# Patient Record
Sex: MALE | URBAN - METROPOLITAN AREA
[De-identification: names, ages, dates, MRNs, and addresses within clinical notes are randomized per-mention and may not be internally consistent; named-entity substitution may affect disease eponyms.]

---

## 2018-12-14 ENCOUNTER — HOSPITAL ENCOUNTER (OUTPATIENT)
Facility: MEDICAL CENTER | Age: 53
End: 2018-12-27
Attending: INTERNAL MEDICINE | Admitting: INTERNAL MEDICINE

## 2018-12-14 PROCEDURE — 87641 MR-STAPH DNA AMP PROBE: CPT

## 2018-12-15 LAB
MRSA DNA SPEC QL NAA+PROBE: NORMAL
SIGNIFICANT IND 70042: NORMAL
SITE SITE: NORMAL
SOURCE SOURCE: NORMAL

## 2018-12-15 ASSESSMENT — ENCOUNTER SYMPTOMS
MYALGIAS: 0
NAUSEA: 0
CONSTIPATION: 0
SHORTNESS OF BREATH: 0
WHEEZING: 0
ABDOMINAL PAIN: 0
HEARTBURN: 0
DIAPHORESIS: 0
CHILLS: 0
HEADACHES: 0
FEVER: 0

## 2018-12-15 NOTE — [facility]
Hospital Medicine Progress Note, Adult, Complex               Author: Myriam Colmenares Date & Time created: 12/15/2018  9:49 AM     CC: left knee impaired mobility    Interval History:  Patient with left knee surgery 8/1 and impaired mobility worsening after surgery with fibrosis found on imaging  Surgical manipulation was done yesterday with patient having improved mobility today  He is to have daily physical therapy    Review of Systems:  Review of Systems   Constitutional: Negative for chills, diaphoresis and fever.   HENT: Negative for hearing loss.    Respiratory: Negative for shortness of breath and wheezing.    Cardiovascular: Negative for chest pain and leg swelling.   Gastrointestinal: Negative for abdominal pain, constipation, heartburn and nausea.   Genitourinary: Negative for dysuria and frequency.   Musculoskeletal: Negative for myalgias.        Left knee pain and mobility improved after surgical manipulation yesterday   Skin: Negative for itching and rash.   Neurological: Negative for headaches.       Physical Exam:  Physical Exam   Constitutional: He is oriented to person, place, and time. No distress.   HENT:   Mouth/Throat: Oropharynx is clear and moist. No oropharyngeal exudate.   Eyes: Conjunctivae are normal. No scleral icterus.   Neck: Neck supple.   Cardiovascular: Normal rate and regular rhythm.    No murmur heard.  Pulmonary/Chest: Breath sounds normal. No respiratory distress.   Abdominal: Bowel sounds are normal. He exhibits no distension.   Musculoskeletal:   Bruise over lateral left knee with small palpable joint effusion   Neurological: He is alert and oriented to person, place, and time. Coordination normal.   Skin: Skin is warm and dry. He is not diaphoretic.   Psychiatric: His behavior is normal.   Nursing note and vitals reviewed.      Labs:          No results for input(s): SODIUM, POTASSIUM, CHLORIDE, CO2, BUN, CREATININE, MAGNESIUM, PHOSPHORUS, CALCIUM in the last 72 hours.  No  results for input(s): ALTSGPT, ASTSGOT, ALKPHOSPHAT, TBILIRUBIN, DBILIRUBIN, GAMMAGT, AMYLASE, LIPASE, ALB, PREALBUMIN, GLUCOSE in the last 72 hours.  No results for input(s): RBC, HEMOGLOBIN, HEMATOCRIT, PLATELETCT, PROTHROMBTM, APTT, INR, IRON, FERRITIN, TOTIRONBC in the last 72 hours.            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

## 2018-12-16 ASSESSMENT — ENCOUNTER SYMPTOMS
ORTHOPNEA: 0
NAUSEA: 0
CONSTIPATION: 0
DIAPHORESIS: 0
SHORTNESS OF BREATH: 0
WHEEZING: 0
HEARTBURN: 0
FEVER: 0
PALPITATIONS: 0
ABDOMINAL PAIN: 0
MYALGIAS: 0
HEADACHES: 0

## 2018-12-16 NOTE — [facility]
Hospital Medicine Progress Note, Adult, Complex               Author: Myriam Colmenares Date & Time created: 12/16/2018  11:14 AM     CC: left knee impaired mobility    Interval History:  Patient with left knee surgery 8/1 and impaired mobility worsening after surgery with fibrosis found on imaging  Surgical manipulation was done yesterday with patient having improved mobility today  The patient is working well with physical therapy and denies pain    Review of Systems:  Review of Systems   Constitutional: Negative for diaphoresis and fever.   HENT: Negative for hearing loss.    Respiratory: Negative for shortness of breath and wheezing.    Cardiovascular: Negative for chest pain, palpitations and orthopnea.   Gastrointestinal: Negative for abdominal pain, constipation, heartburn and nausea.   Genitourinary: Negative for dysuria and frequency.   Musculoskeletal: Negative for myalgias.        Left knee mobility much improved   Skin: Negative for rash.   Neurological: Negative for headaches.       Physical Exam:  Physical Exam   Constitutional: He is oriented to person, place, and time. No distress.   HENT:   Mouth/Throat: No oropharyngeal exudate.   Eyes: Conjunctivae are normal. No scleral icterus.   Neck: Neck supple.   Cardiovascular: Normal rate and regular rhythm.    No murmur heard.  Pulmonary/Chest: Breath sounds normal. No respiratory distress. He has no wheezes.   Abdominal: He exhibits no distension.   Musculoskeletal:   Left knee small palpable joint effusion   Neurological: He is alert and oriented to person, place, and time. Coordination normal.   Skin: Skin is warm and dry. He is not diaphoretic.   Psychiatric: His behavior is normal.   Nursing note and vitals reviewed.      Labs:          No results for input(s): SODIUM, POTASSIUM, CHLORIDE, CO2, BUN, CREATININE, MAGNESIUM, PHOSPHORUS, CALCIUM in the last 72 hours.  No results for input(s): ALTSGPT, ASTSGOT, ALKPHOSPHAT, TBILIRUBIN, DBILIRUBIN, GAMMAGT,  AMYLASE, LIPASE, ALB, PREALBUMIN, GLUCOSE in the last 72 hours.  No results for input(s): RBC, HEMOGLOBIN, HEMATOCRIT, PLATELETCT, PROTHROMBTM, APTT, INR, IRON, FERRITIN, TOTIRONBC in the last 72 hours.            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

## 2018-12-17 PROCEDURE — 302244 HCHG LTACH STAT

## 2018-12-17 ASSESSMENT — ENCOUNTER SYMPTOMS
SINUS PAIN: 0
SHORTNESS OF BREATH: 0
FEVER: 0
WHEEZING: 0
COUGH: 0
ABDOMINAL PAIN: 0
HEADACHES: 0
NAUSEA: 0
WEAKNESS: 0
HEARTBURN: 0
CONSTIPATION: 0
MYALGIAS: 0

## 2018-12-17 NOTE — [facility]
Hospital Medicine Progress Note, Adult, Complex               Author: Myriam Colmenares Date & Time created: 12/17/2018  9:04 AM     CC: left knee impaired mobility    Interval History:  Patient with left knee surgery 8/1 and impaired mobility worsening after surgery with fibrosis found on imaging  Surgical manipulation was done in the operating room  Daily physical therapy continues with the patient having better range of motion    Review of Systems:  Review of Systems   Constitutional: Negative for fever and malaise/fatigue.   HENT: Negative for congestion and sinus pain.    Respiratory: Negative for cough, shortness of breath and wheezing.    Cardiovascular: Negative for chest pain and leg swelling.   Gastrointestinal: Negative for abdominal pain, constipation, heartburn and nausea.   Genitourinary: Negative for frequency and urgency.   Musculoskeletal: Negative for myalgias.        Left knee mobility much improved   Skin: Negative for itching.   Neurological: Negative for weakness and headaches.       Physical Exam:  Physical Exam   Constitutional: He is oriented to person, place, and time. No distress.   HENT:   Mouth/Throat: Oropharynx is clear and moist. No oropharyngeal exudate.   Eyes: Pupils are equal, round, and reactive to light. No scleral icterus.   Neck: No tracheal deviation present.   Cardiovascular: Normal rate and regular rhythm.    No murmur heard.  Pulmonary/Chest: Breath sounds normal. No stridor. No respiratory distress. He has no wheezes.   Abdominal: Soft. He exhibits no distension.   Musculoskeletal:   Left knee small palpable joint effusion   Neurological: He is alert and oriented to person, place, and time. Coordination normal.   Skin: Skin is dry. No rash noted. He is not diaphoretic. No erythema.   Psychiatric: His behavior is normal.   Nursing note and vitals reviewed.      Labs:          No results for input(s): SODIUM, POTASSIUM, CHLORIDE, CO2, BUN, CREATININE, MAGNESIUM, PHOSPHORUS,  CALCIUM in the last 72 hours.  No results for input(s): ALTSGPT, ASTSGOT, ALKPHOSPHAT, TBILIRUBIN, DBILIRUBIN, GAMMAGT, AMYLASE, LIPASE, ALB, PREALBUMIN, GLUCOSE in the last 72 hours.  No results for input(s): RBC, HEMOGLOBIN, HEMATOCRIT, PLATELETCT, PROTHROMBTM, APTT, INR, IRON, FERRITIN, TOTIRONBC in the last 72 hours.            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

## 2018-12-18 ASSESSMENT — ENCOUNTER SYMPTOMS
SHORTNESS OF BREATH: 0
SORE THROAT: 0
NAUSEA: 0
CONSTIPATION: 0
DIARRHEA: 0
COUGH: 0
FEVER: 0
HEADACHES: 0
ABDOMINAL PAIN: 0
VOMITING: 0
PALPITATIONS: 0

## 2018-12-18 NOTE — [facility]
Hospital Medicine Progress Note, Adult, Complex               Author: Kimberly LINDO Gabriella Date & Time created: 12/18/2018  5:58 AM     CC: need for PT    Interval History:  No events reported  Ambulated 200 feet with therapy yesterday    Review of Systems:  Review of Systems   Constitutional: Negative for fever.   HENT: Negative for sore throat.    Respiratory: Negative for cough and shortness of breath.    Cardiovascular: Negative for chest pain and palpitations.   Gastrointestinal: Negative for abdominal pain, constipation, diarrhea, nausea and vomiting.   Genitourinary: Negative for dysuria.   Musculoskeletal: Positive for joint pain (L knee).   Neurological: Negative for headaches.       T 97.9 P 65 /84 RR 18 SaO2 96% I/O 2.5/brp  Physical Exam   Constitutional: He is oriented to person, place, and time. He appears well-developed and well-nourished. No distress.   HENT:   Head: Normocephalic and atraumatic.   Mouth/Throat: No oropharyngeal exudate.   Eyes: Conjunctivae are normal. Right eye exhibits no discharge. Left eye exhibits no discharge. No scleral icterus.   Neck: No tracheal deviation present.   Cardiovascular: Normal rate, regular rhythm and intact distal pulses.    Pulmonary/Chest: Effort normal. No respiratory distress. He has no wheezes.   Abdominal: Soft. Bowel sounds are normal. He exhibits no distension. There is no tenderness.   Musculoskeletal: He exhibits no edema.   Neurological: He is alert and oriented to person, place, and time.   Skin: Skin is warm.   Vitals reviewed.      Labs:          No results for input(s): SODIUM, POTASSIUM, CHLORIDE, CO2, BUN, CREATININE, MAGNESIUM, PHOSPHORUS, CALCIUM in the last 72 hours.  No results for input(s): ALTSGPT, ASTSGOT, ALKPHOSPHAT, TBILIRUBIN, DBILIRUBIN, GAMMAGT, AMYLASE, LIPASE, ALB, PREALBUMIN, GLUCOSE in the last 72 hours.  No results for input(s): RBC, HEMOGLOBIN, HEMATOCRIT, PLATELETCT, PROTHROMBTM, APTT, INR, IRON, FERRITIN, TOTIRONBC in  the last 72 hours.           GI/Nutrition:  Orders Placed This Encounter   Procedures   • Diet Order Regular     Standing Status:   Standing     Number of Occurrences:   1     Order Specific Question:   Diet:     Answer:   Regular [1]     Medical Decision Making, by Problem:  H/o L knee partial replacement with scar tissue and decreased mobility s/p scar tissue release under anesthesia prior to admit   -PT  H/o HCV s/p treatment  Prior substance abuse (meth, mj, Etoh)  Nutrition   -excellent po intake    Quality-Core Measures   Reviewed items::  Medications reviewed  Herman catheter::  No Herman  DVT prophylaxis pharmacological::  Enoxaparin (Lovenox)

## 2018-12-19 ASSESSMENT — ENCOUNTER SYMPTOMS
NAUSEA: 0
SORE THROAT: 0
SHORTNESS OF BREATH: 0
VOMITING: 0
FEVER: 0
CONSTIPATION: 0
HEADACHES: 0
DIARRHEA: 0
ABDOMINAL PAIN: 0
COUGH: 0

## 2018-12-19 NOTE — [facility]
Hospital Medicine Progress Note, Adult, Complex               Author: Kimberly LINDO Rulo Date & Time created: 12/19/2018  6:20 AM     CC: need for PT    Interval History:  Improved ROM per report  Pain OK    Review of Systems:  Review of Systems   Constitutional: Negative for fever.   HENT: Negative for sore throat.    Respiratory: Negative for cough and shortness of breath.    Cardiovascular: Negative for chest pain.   Gastrointestinal: Negative for abdominal pain, constipation, diarrhea, nausea and vomiting.   Genitourinary: Negative for dysuria.   Musculoskeletal: Positive for joint pain (L knee).   Skin: Negative for rash.   Neurological: Negative for headaches.       T 98.8 P88BP 133/88 RR 18 SaO2 97% I/O 2.7/brp  Physical Exam   Constitutional: He is oriented to person, place, and time. He appears well-developed and well-nourished. No distress.   HENT:   Head: Normocephalic and atraumatic.   Mouth/Throat: No oropharyngeal exudate.   Eyes: Conjunctivae are normal. Right eye exhibits no discharge. Left eye exhibits no discharge. No scleral icterus.   Neck: No tracheal deviation present.   Cardiovascular: Normal rate, regular rhythm and intact distal pulses.    Pulmonary/Chest: Effort normal. No respiratory distress. He has no wheezes.   Abdominal: Soft. Bowel sounds are normal. He exhibits no distension. There is no tenderness.   Musculoskeletal: He exhibits no edema.   L knee incision healed  No warmth/erythema   Neurological: He is alert and oriented to person, place, and time.   Skin: Skin is warm.   Vitals reviewed.      Labs:          No results for input(s): SODIUM, POTASSIUM, CHLORIDE, CO2, BUN, CREATININE, MAGNESIUM, PHOSPHORUS, CALCIUM in the last 72 hours.  No results for input(s): ALTSGPT, ASTSGOT, ALKPHOSPHAT, TBILIRUBIN, DBILIRUBIN, GAMMAGT, AMYLASE, LIPASE, ALB, PREALBUMIN, GLUCOSE in the last 72 hours.  No results for input(s): RBC, HEMOGLOBIN, HEMATOCRIT, PLATELETCT, PROTHROMBTM, APTT, INR, IRON,  FERRITIN, TOTIRONBC in the last 72 hours.           GI/Nutrition:  Orders Placed This Encounter   Procedures   • Diet Order Regular     Standing Status:   Standing     Number of Occurrences:   1     Order Specific Question:   Diet:     Answer:   Regular [1]     Medical Decision Making, by Problem:  H/o L knee partial replacement with scar tissue and decreased mobility s/p scar tissue release under anesthesia prior to admit   -PT   -add voltaren gel  H/o HCV s/p treatment  Prior substance abuse (meth, mj, Etoh)  Nutrition   -excellent po intake    Quality-Core Measures   Reviewed items::  Medications reviewed  Herman catheter::  No Herman  DVT prophylaxis pharmacological::  Enoxaparin (Lovenox)

## 2018-12-20 ASSESSMENT — ENCOUNTER SYMPTOMS
ABDOMINAL PAIN: 0
VOMITING: 0
COUGH: 0
INSOMNIA: 0
DIARRHEA: 0
FEVER: 0
SORE THROAT: 0
CONSTIPATION: 0
HEADACHES: 0
SHORTNESS OF BREATH: 0
NAUSEA: 0

## 2018-12-20 NOTE — [facility]
Hospital Medicine Progress Note, Adult, Complex               Author: Kimberly Quiroz Date & Time created: 12/20/2018  6:23 AM     CC: need for PT    Interval History:  Has not noticed improved pain with voltaren gel  Sore but ROM still improving    Review of Systems:  Review of Systems   Constitutional: Negative for fever.   HENT: Negative for sore throat.    Respiratory: Negative for cough and shortness of breath.    Cardiovascular: Negative for chest pain.   Gastrointestinal: Negative for abdominal pain, constipation, diarrhea, nausea and vomiting.   Genitourinary: Negative for dysuria.   Musculoskeletal: Positive for joint pain (L knee).   Skin: Negative for rash.   Neurological: Negative for headaches.   Psychiatric/Behavioral: The patient does not have insomnia.        T 98.1P65BP 130/81 RR 18 SaO2 94% I/O 2.2/brp  Physical Exam   Constitutional: He is oriented to person, place, and time. He appears well-developed and well-nourished. No distress.   HENT:   Head: Normocephalic and atraumatic.   Mouth/Throat: No oropharyngeal exudate.   Eyes: Conjunctivae are normal. Right eye exhibits no discharge. Left eye exhibits no discharge. No scleral icterus.   Neck: No tracheal deviation present.   Cardiovascular: Normal rate, regular rhythm and intact distal pulses.    Pulmonary/Chest: Effort normal. No respiratory distress. He has no wheezes.   Abdominal: Soft. Bowel sounds are normal. He exhibits no distension. There is no tenderness.   Musculoskeletal: He exhibits no edema.   L knee incision healed  No warmth/erythema   Neurological: He is alert and oriented to person, place, and time.   Skin: Skin is warm.   Vitals reviewed.      Labs:          No results for input(s): SODIUM, POTASSIUM, CHLORIDE, CO2, BUN, CREATININE, MAGNESIUM, PHOSPHORUS, CALCIUM in the last 72 hours.  No results for input(s): ALTSGPT, ASTSGOT, ALKPHOSPHAT, TBILIRUBIN, DBILIRUBIN, GAMMAGT, AMYLASE, LIPASE, ALB, PREALBUMIN, GLUCOSE in the last  72 hours.  No results for input(s): RBC, HEMOGLOBIN, HEMATOCRIT, PLATELETCT, PROTHROMBTM, APTT, INR, IRON, FERRITIN, TOTIRONBC in the last 72 hours.           GI/Nutrition:  Orders Placed This Encounter   Procedures   • Diet Order Regular     Standing Status:   Standing     Number of Occurrences:   1     Order Specific Question:   Diet:     Answer:   Regular [1]     Medical Decision Making, by Problem:  H/o L knee partial replacement with scar tissue and decreased mobility s/p scar tissue release under anesthesia prior to admit   -PT   -continue voltaren gel, add lidopatch  H/o HCV s/p treatment  Prior substance abuse (meth, mj, Etoh)  Nutrition   -excellent po intake    Quality-Core Measures   Reviewed items::  Medications reviewed  Herman catheter::  No Herman  DVT prophylaxis pharmacological::  Enoxaparin (Lovenox)

## 2018-12-21 ASSESSMENT — ENCOUNTER SYMPTOMS
VOMITING: 0
NAUSEA: 0
ABDOMINAL PAIN: 0
FEVER: 0
DIARRHEA: 0
SHORTNESS OF BREATH: 0
INSOMNIA: 0
COUGH: 0
HEADACHES: 0
SORE THROAT: 0
BLOOD IN STOOL: 0
CONSTIPATION: 0

## 2018-12-21 NOTE — [facility]
Hospital Medicine Progress Note, Adult, Complex               Author: Kimberly LINDO Gabriella Date & Time created: 12/21/2018  6:05 AM     CC: need for PT    Interval History:  Thinks lidopatch and voltaren gel helping  ROM at 110 degrees    Review of Systems:  Review of Systems   Constitutional: Negative for fever.   HENT: Negative for sore throat.    Respiratory: Negative for cough and shortness of breath.    Cardiovascular: Negative for chest pain.   Gastrointestinal: Negative for abdominal pain, blood in stool, constipation, diarrhea, nausea and vomiting.   Genitourinary: Negative for dysuria.   Musculoskeletal: Positive for joint pain (L knee).   Skin: Negative for rash.   Neurological: Negative for headaches.   Psychiatric/Behavioral: The patient does not have insomnia.        T 98.2P61BP 106/79 RR 18 SaO2 94% I/O 1.8/brp  Physical Exam   Constitutional: He is oriented to person, place, and time. He appears well-developed and well-nourished. No distress.   HENT:   Head: Normocephalic and atraumatic.   Mouth/Throat: No oropharyngeal exudate.   Eyes: Conjunctivae are normal. Right eye exhibits no discharge. Left eye exhibits no discharge. No scleral icterus.   Neck: No tracheal deviation present.   Cardiovascular: Normal rate, regular rhythm and intact distal pulses.    Pulmonary/Chest: Effort normal. No respiratory distress. He has no wheezes.   Abdominal: Soft. Bowel sounds are normal. He exhibits no distension. There is no tenderness.   Musculoskeletal: He exhibits no edema.   L knee incision healed  No warmth/erythema   Neurological: He is alert and oriented to person, place, and time.   Skin: Skin is warm.   Vitals reviewed.      Labs:          No results for input(s): SODIUM, POTASSIUM, CHLORIDE, CO2, BUN, CREATININE, MAGNESIUM, PHOSPHORUS, CALCIUM in the last 72 hours.  No results for input(s): ALTSGPT, ASTSGOT, ALKPHOSPHAT, TBILIRUBIN, DBILIRUBIN, GAMMAGT, AMYLASE, LIPASE, ALB, PREALBUMIN, GLUCOSE in the last  72 hours.  No results for input(s): RBC, HEMOGLOBIN, HEMATOCRIT, PLATELETCT, PROTHROMBTM, APTT, INR, IRON, FERRITIN, TOTIRONBC in the last 72 hours.           GI/Nutrition:  Orders Placed This Encounter   Procedures   • Diet Order Regular     Standing Status:   Standing     Number of Occurrences:   1     Order Specific Question:   Diet:     Answer:   Regular [1]     Medical Decision Making, by Problem:  H/o L knee partial replacement with scar tissue and decreased mobility s/p scar tissue release under anesthesia prior to admit   -PT   -continue voltaren gel, lidopatch  H/o HCV s/p treatment  Prior substance abuse (meth, mj, Etoh)  Nutrition   -excellent po intake    Quality-Core Measures   Reviewed items::  Medications reviewed  Herman catheter::  No Herman  DVT prophylaxis pharmacological::  Enoxaparin (Lovenox)

## 2018-12-22 ASSESSMENT — ENCOUNTER SYMPTOMS
INSOMNIA: 0
NAUSEA: 0
ABDOMINAL PAIN: 0
SHORTNESS OF BREATH: 0
FEVER: 0
VOMITING: 0
SORE THROAT: 0
HEADACHES: 0
COUGH: 0
DIARRHEA: 0

## 2018-12-22 NOTE — [facility]
Hospital Medicine Progress Note, Adult, Complex               Author:  BELGICA Quiroz Date & Time created: 12/22/2018  8:34 AM     CC: need for PT    Interval History:  Still without dramatic increase in ROM, before surgery 101-107, now 110  No complaints, doing exercises on his own    Review of Systems:  Review of Systems   Constitutional: Negative for fever.   HENT: Negative for sore throat.    Respiratory: Negative for cough and shortness of breath.    Cardiovascular: Negative for chest pain.   Gastrointestinal: Negative for abdominal pain, diarrhea, nausea and vomiting.   Genitourinary: Negative for dysuria.   Musculoskeletal: Positive for joint pain (L knee).   Skin: Negative for rash.   Neurological: Negative for headaches.   Psychiatric/Behavioral: The patient does not have insomnia.        T 98.6P68BP 124/87RR20 SaO2 94% I/O 1.7/brp  Physical Exam   Constitutional: He is oriented to person, place, and time. He appears well-developed and well-nourished. No distress.   HENT:   Head: Normocephalic and atraumatic.   Mouth/Throat: No oropharyngeal exudate.   Eyes: Conjunctivae are normal. Right eye exhibits no discharge. Left eye exhibits no discharge. No scleral icterus.   Neck: No tracheal deviation present.   Cardiovascular: Normal rate, regular rhythm and intact distal pulses.    Pulmonary/Chest: Effort normal. No respiratory distress. He has no wheezes.   Abdominal: Soft. Bowel sounds are normal. He exhibits no distension. There is no tenderness.   Musculoskeletal: He exhibits no edema.   L knee incision healed  No warmth/erythema   Neurological: He is alert and oriented to person, place, and time.   Skin: Skin is warm.   Vitals reviewed.      Labs:          No results for input(s): SODIUM, POTASSIUM, CHLORIDE, CO2, BUN, CREATININE, MAGNESIUM, PHOSPHORUS, CALCIUM in the last 72 hours.  No results for input(s): ALTSGPT, ASTSGOT, ALKPHOSPHAT, TBILIRUBIN, DBILIRUBIN, GAMMAGT, AMYLASE, LIPASE, ALB, PREALBUMIN,  GLUCOSE in the last 72 hours.  No results for input(s): RBC, HEMOGLOBIN, HEMATOCRIT, PLATELETCT, PROTHROMBTM, APTT, INR, IRON, FERRITIN, TOTIRONBC in the last 72 hours.           GI/Nutrition:  Orders Placed This Encounter   Procedures   • Diet Order Regular     Standing Status:   Standing     Number of Occurrences:   1     Order Specific Question:   Diet:     Answer:   Regular [1]     Medical Decision Making, by Problem:  H/o L knee partial replacement with scar tissue and decreased mobility s/p scar tissue release under anesthesia prior to admit   -PT without significant improvement   -continue voltaren gel, lidopatch  H/o HCV s/p treatment  Prior substance abuse (meth, mj, Etoh)  Nutrition   -excellent po intake    Quality-Core Measures   Reviewed items::  Medications reviewed  Herman catheter::  No Herman  DVT prophylaxis pharmacological::  Enoxaparin (Lovenox)

## 2018-12-23 ASSESSMENT — ENCOUNTER SYMPTOMS
SHORTNESS OF BREATH: 0
HEADACHES: 0
DIARRHEA: 0
COUGH: 0
ABDOMINAL PAIN: 0
NAUSEA: 0
FEVER: 0
SORE THROAT: 0
VOMITING: 0
INSOMNIA: 0

## 2018-12-23 NOTE — [facility]
Hospital Medicine Progress Note, Adult, Complex               Author:  BELGICA Quiroz Date & Time created: 12/23/2018  6:14 AM     CC: need for PT    Interval History:  Did not have PT yesterday  would like double portions  No change in ROM    Review of Systems:  Review of Systems   Constitutional: Negative for fever.   HENT: Negative for sore throat.    Respiratory: Negative for cough and shortness of breath.    Cardiovascular: Negative for chest pain.   Gastrointestinal: Negative for abdominal pain, diarrhea, nausea and vomiting.   Genitourinary: Negative for dysuria.   Musculoskeletal: Positive for joint pain (L knee).   Neurological: Negative for headaches.   Psychiatric/Behavioral: The patient does not have insomnia.        T [id number] 124/79RR20 SaO2 95% I/O 1.9/brp  Physical Exam   Constitutional: He is oriented to person, place, and time. He appears well-developed and well-nourished. No distress.   HENT:   Head: Normocephalic and atraumatic.   Mouth/Throat: No oropharyngeal exudate.   Eyes: Conjunctivae are normal. Right eye exhibits no discharge. Left eye exhibits no discharge. No scleral icterus.   Neck: No tracheal deviation present.   Cardiovascular: Normal rate, regular rhythm and intact distal pulses.    Pulmonary/Chest: Effort normal. No respiratory distress. He has no wheezes.   Abdominal: Soft. Bowel sounds are normal. He exhibits no distension. There is no tenderness.   Musculoskeletal: He exhibits no edema.   L knee incision healed  No warmth/erythema   Neurological: He is alert and oriented to person, place, and time.   Skin: Skin is warm.   Vitals reviewed.      Labs:          No results for input(s): SODIUM, POTASSIUM, CHLORIDE, CO2, BUN, CREATININE, MAGNESIUM, PHOSPHORUS, CALCIUM in the last 72 hours.  No results for input(s): ALTSGPT, ASTSGOT, ALKPHOSPHAT, TBILIRUBIN, DBILIRUBIN, GAMMAGT, AMYLASE, LIPASE, ALB, PREALBUMIN, GLUCOSE in the last 72 hours.  No results for input(s): RBC, HEMOGLOBIN,  HEMATOCRIT, PLATELETCT, PROTHROMBTM, APTT, INR, IRON, FERRITIN, TOTIRONBC in the last 72 hours.           GI/Nutrition:  Orders Placed This Encounter   Procedures   • Diet Order Regular     Standing Status:   Standing     Number of Occurrences:   1     Order Specific Question:   Diet:     Answer:   Regular [1]     Medical Decision Making, by Problem:  H/o L knee partial replacement with scar tissue and decreased mobility s/p scar tissue release under anesthesia prior to admit   -PT without significant improvement, no PT yesterday   -continue voltaren gel, lidopatch  H/o HCV s/p treatment  Prior substance abuse (meth, mj, Etoh)  Nutrition   -excellent po intake   -ask for double portions    Quality-Core Measures   Reviewed items::  Medications reviewed  Herman catheter::  No Herman  DVT prophylaxis pharmacological::  Enoxaparin (Lovenox)

## 2018-12-24 ASSESSMENT — ENCOUNTER SYMPTOMS
NAUSEA: 0
COUGH: 0
SHORTNESS OF BREATH: 0
VOMITING: 0
HEADACHES: 0
INSOMNIA: 0
DIARRHEA: 0
ABDOMINAL PAIN: 0
FEVER: 0
SORE THROAT: 0
CHILLS: 0

## 2018-12-24 NOTE — [facility]
Hospital Medicine Progress Note, Adult, Complex               Author: Kimberly LINDO Sayner Date & Time created: 12/24/2018  6:04 AM     CC: need for PT    Interval History:  Worked with PT  No improved ROM  Happy with double portions    Review of Systems:  Review of Systems   Constitutional: Negative for chills and fever.   HENT: Negative for sore throat.    Respiratory: Negative for cough and shortness of breath.    Cardiovascular: Negative for chest pain.   Gastrointestinal: Negative for abdominal pain, diarrhea, nausea and vomiting.   Genitourinary: Negative for dysuria.   Musculoskeletal: Positive for joint pain (L knee).   Skin: Negative for rash.   Neurological: Negative for headaches.   Psychiatric/Behavioral: The patient does not have insomnia.        T 98 P 68 /86 MD24OdD0 95% I/O not in chart  Physical Exam   Constitutional: He is oriented to person, place, and time. He appears well-developed and well-nourished. No distress.   HENT:   Head: Normocephalic and atraumatic.   Mouth/Throat: No oropharyngeal exudate.   Eyes: Conjunctivae are normal. Right eye exhibits no discharge. Left eye exhibits no discharge. No scleral icterus.   Neck: No tracheal deviation present.   Cardiovascular: Normal rate, regular rhythm and intact distal pulses.    Pulmonary/Chest: Effort normal. No respiratory distress. He has no wheezes.   Abdominal: Soft. Bowel sounds are normal. He exhibits no distension. There is no tenderness.   Musculoskeletal: He exhibits no edema.   L knee incision healed  No warmth/erythema   Neurological: He is alert and oriented to person, place, and time.   Skin: Skin is warm. No rash noted. No erythema.   Vitals reviewed.      Labs:          No results for input(s): SODIUM, POTASSIUM, CHLORIDE, CO2, BUN, CREATININE, MAGNESIUM, PHOSPHORUS, CALCIUM in the last 72 hours.  No results for input(s): ALTSGPT, ASTSGOT, ALKPHOSPHAT, TBILIRUBIN, DBILIRUBIN, GAMMAGT, AMYLASE, LIPASE, ALB, PREALBUMIN, GLUCOSE in  the last 72 hours.  No results for input(s): RBC, HEMOGLOBIN, HEMATOCRIT, PLATELETCT, PROTHROMBTM, APTT, INR, IRON, FERRITIN, TOTIRONBC in the last 72 hours.           GI/Nutrition:  Orders Placed This Encounter   Procedures   • Diet Order Regular     Standing Status:   Standing     Number of Occurrences:   1     Order Specific Question:   Diet:     Answer:   Regular [1]     Medical Decision Making, by Problem:  H/o L knee partial replacement with scar tissue and decreased mobility s/p scar tissue release under anethesia   -continue voltaren gel, lidopatch   -PT is limited as this is not rehab  H/o HCV s/p treatment  Prior substance abuse (meth, mj, Etoh)  Nutrition   -excellent po intake    Quality-Core Measures   Reviewed items::  Medications reviewed  Herman catheter::  No Herman  DVT prophylaxis pharmacological::  Enoxaparin (Lovenox)

## 2018-12-25 ASSESSMENT — ENCOUNTER SYMPTOMS
NAUSEA: 0
HEADACHES: 0
MYALGIAS: 0
SHORTNESS OF BREATH: 0
VOMITING: 0
SINUS PAIN: 0
COUGH: 0
FEVER: 0
PALPITATIONS: 0
CHILLS: 0
ABDOMINAL PAIN: 0
CONSTIPATION: 0

## 2018-12-25 NOTE — [facility]
Hospital Medicine Progress Note, Adult, Complex               Author: Myriam Colmenares Date & Time created: 12/25/2018  11:07 AM     CC: left knee impaired mobility    Interval History:  Patient with left knee surgery 8/1 and impaired mobility worsening after surgery with fibrosis found on imaging  Surgical manipulation was done in the operating room  The patient is working on his own mobility/ physical therapy exercises independently    Review of Systems:  Review of Systems   Constitutional: Negative for chills and fever.   HENT: Negative for congestion and sinus pain.    Respiratory: Negative for cough and shortness of breath.    Cardiovascular: Negative for chest pain, palpitations and leg swelling.   Gastrointestinal: Negative for abdominal pain, constipation, nausea and vomiting.   Genitourinary: Negative for dysuria and hematuria.   Musculoskeletal: Negative for myalgias.        Left knee stiff with movement   Skin: Negative for itching and rash.   Neurological: Negative for headaches.       Physical Exam:  Physical Exam   Constitutional: He is oriented to person, place, and time. No distress.   HENT:   Mouth/Throat: Oropharynx is clear and moist. No oropharyngeal exudate.   Eyes: Pupils are equal, round, and reactive to light. Conjunctivae are normal. No scleral icterus.   Neck: Neck supple.   Cardiovascular: Normal rate and regular rhythm.    No murmur heard.  Pulmonary/Chest: Breath sounds normal. No respiratory distress. He has no wheezes.   Abdominal: Soft. Bowel sounds are normal. He exhibits no distension.   Neurological: He is alert and oriented to person, place, and time. Coordination normal.   Skin: Skin is warm and dry. He is not diaphoretic.   Psychiatric: His behavior is normal.   Nursing note and vitals reviewed.      Labs:          No results for input(s): SODIUM, POTASSIUM, CHLORIDE, CO2, BUN, CREATININE, MAGNESIUM, PHOSPHORUS, CALCIUM in the last 72 hours.  No results for input(s): ALTSGPT,  ASTSGOT, ALKPHOSPHAT, TBILIRUBIN, DBILIRUBIN, GAMMAGT, AMYLASE, LIPASE, ALB, PREALBUMIN, GLUCOSE in the last 72 hours.  No results for input(s): RBC, HEMOGLOBIN, HEMATOCRIT, PLATELETCT, PROTHROMBTM, APTT, INR, IRON, FERRITIN, TOTIRONBC in the last 72 hours.            Hemodynamics:   T97.6 /90 HR57 RR18 O2 sat 95%     GI/Nutrition:  Orders Placed This Encounter   Procedures   • Diet Order Regular     Standing Status:   Standing     Number of Occurrences:   1     Order Specific Question:   Diet:     Answer:   Regular [1]     Medical Decision Making, by Problem:  Arthrofibrosis after left knee surgery   OR Manipulation released scar tissue   Continue physical therapy          Quality-Core Measures   Reviewed items::  Labs reviewed and Medications reviewed  Herman catheter::  No Herman  DVT prophylaxis pharmacological::  Not indicated at this time, ambulatory  DVT prophylaxis - mechanical:  Not indicated at this time, ambulatory  Ulcer Prophylaxis::  Not indicated  Assessed for rehabilitation services:  Patient was assess for and/or received rehabilitation services during this hospitalization

## 2018-12-26 ASSESSMENT — ENCOUNTER SYMPTOMS
SHORTNESS OF BREATH: 0
DIAPHORESIS: 0
NAUSEA: 0
MYALGIAS: 0
HEADACHES: 0
CONSTIPATION: 0
FEVER: 0
SINUS PAIN: 0
ABDOMINAL PAIN: 0

## 2018-12-26 NOTE — [facility]
Hospital Medicine Progress Note, Adult, Complex               Author: Myriam Colmenares Date & Time created: 12/26/2018  9:29 AM     CC: left knee impaired mobility    Interval History:  Patient with left knee surgery 8/1 and impaired mobility worsening after surgery with fibrosis found on imaging  Surgical manipulation was done in the operating room  Mobility is improved and patient is working on his own range of motion    Review of Systems:  Review of Systems   Constitutional: Negative for diaphoresis and fever.   HENT: Negative for congestion and sinus pain.    Respiratory: Negative for shortness of breath.    Cardiovascular: Negative for chest pain and leg swelling.   Gastrointestinal: Negative for abdominal pain, constipation and nausea.   Genitourinary: Negative for hematuria.   Musculoskeletal: Negative for myalgias.        Left knee stiff    Skin: Negative for rash.   Neurological: Negative for headaches.       Physical Exam:  Physical Exam   Constitutional: He is oriented to person, place, and time. No distress.   HENT:   Mouth/Throat: No oropharyngeal exudate.   Eyes: Pupils are equal, round, and reactive to light. No scleral icterus.   Neck: Neck supple.   Cardiovascular: Normal rate and regular rhythm.    No murmur heard.  Pulmonary/Chest: Breath sounds normal. No respiratory distress. He has no wheezes.   Abdominal: Soft. He exhibits no distension.   Neurological: He is alert and oriented to person, place, and time. Coordination normal.   Skin: Skin is warm. No rash noted. He is not diaphoretic. No erythema.   Psychiatric: His behavior is normal.   Nursing note and vitals reviewed.      Labs:          No results for input(s): SODIUM, POTASSIUM, CHLORIDE, CO2, BUN, CREATININE, MAGNESIUM, PHOSPHORUS, CALCIUM in the last 72 hours.  No results for input(s): ALTSGPT, ASTSGOT, ALKPHOSPHAT, TBILIRUBIN, DBILIRUBIN, GAMMAGT, AMYLASE, LIPASE, ALB, PREALBUMIN, GLUCOSE in the last 72 hours.  No results for input(s):  RBC, HEMOGLOBIN, HEMATOCRIT, PLATELETCT, PROTHROMBTM, APTT, INR, IRON, FERRITIN, TOTIRONBC in the last 72 hours.            Hemodynamics:   T97.9 /80 HR59 RR18 O2 sat 95%     GI/Nutrition:  Orders Placed This Encounter   Procedures   • Diet Order Regular     Standing Status:   Standing     Number of Occurrences:   1     Order Specific Question:   Diet:     Answer:   Regular [1]     Medical Decision Making, by Problem:  Arthrofibrosis after left knee surgery   OR Manipulation released scar tissue   Continue physical therapy   Transfer to incarcerating facility when transportation ready          Quality-Core Measures   Reviewed items::  Labs reviewed and Medications reviewed  Herman catheter::  No Herman  DVT prophylaxis pharmacological::  Not indicated at this time, ambulatory  DVT prophylaxis - mechanical:  Not indicated at this time, ambulatory  Ulcer Prophylaxis::  Not indicated  Assessed for rehabilitation services:  Patient was assess for and/or received rehabilitation services during this hospitalization

## 2018-12-27 ASSESSMENT — ENCOUNTER SYMPTOMS
PALPITATIONS: 0
SINUS PAIN: 0
FEVER: 0
SHORTNESS OF BREATH: 0
COUGH: 0
NAUSEA: 0
ABDOMINAL PAIN: 0
CONSTIPATION: 0
CHILLS: 0
HEADACHES: 0
VOMITING: 0

## 2018-12-27 NOTE — [facility]
Hospital Medicine Progress Note, Adult, Complex               Author: Myriam Colmenares Date & Time created: 12/27/2018  7:49 AM     CC: left knee impaired mobility    Interval History:  Patient with left knee surgery 8/1 and impaired mobility worsening after surgery with fibrosis found on imaging  Surgical manipulation was done in the operating room  He continues to work with physical therapy and do exercises on his own    Review of Systems:  Review of Systems   Constitutional: Negative for chills and fever.   HENT: Negative for congestion and sinus pain.    Respiratory: Negative for cough and shortness of breath.    Cardiovascular: Negative for chest pain, palpitations and leg swelling.   Gastrointestinal: Negative for abdominal pain, constipation, nausea and vomiting.   Genitourinary: Negative for dysuria.   Musculoskeletal:        Left knee stiff    Skin: Negative for itching and rash.   Neurological: Negative for headaches.       Physical Exam:  Physical Exam   Constitutional: He is oriented to person, place, and time. No distress.   HENT:   Mouth/Throat: Oropharynx is clear and moist.   Eyes: Conjunctivae are normal. No scleral icterus.   Neck: No tracheal deviation present.   Cardiovascular: Normal rate and regular rhythm.    No murmur heard.  Pulmonary/Chest: Breath sounds normal. No stridor. No respiratory distress. He has no wheezes.   Abdominal: Soft. He exhibits no distension.   Neurological: He is alert and oriented to person, place, and time. Coordination normal.   Skin: Skin is warm and dry. No rash noted. He is not diaphoretic.   Psychiatric: His behavior is normal.   Nursing note and vitals reviewed.      Labs:          No results for input(s): SODIUM, POTASSIUM, CHLORIDE, CO2, BUN, CREATININE, MAGNESIUM, PHOSPHORUS, CALCIUM in the last 72 hours.  No results for input(s): ALTSGPT, ASTSGOT, ALKPHOSPHAT, TBILIRUBIN, DBILIRUBIN, GAMMAGT, AMYLASE, LIPASE, ALB, PREALBUMIN, GLUCOSE in the last 72 hours.  No  results for input(s): RBC, HEMOGLOBIN, HEMATOCRIT, PLATELETCT, PROTHROMBTM, APTT, INR, IRON, FERRITIN, TOTIRONBC in the last 72 hours.            Hemodynamics:   T97.5 /96 HR63 RR18 O2 sat 95%     GI/Nutrition:  Orders Placed This Encounter   Procedures   • Diet Order Regular     Standing Status:   Standing     Number of Occurrences:   1     Order Specific Question:   Diet:     Answer:   Regular [1]     Medical Decision Making, by Problem:  Arthrofibrosis after left knee surgery   OR Manipulation released scar tissue   Continue range of motion exercises          Quality-Core Measures   Reviewed items::  Labs reviewed and Medications reviewed  Herman catheter::  No Herman  DVT prophylaxis pharmacological::  Not indicated at this time, ambulatory  DVT prophylaxis - mechanical:  Not indicated at this time, ambulatory  Ulcer Prophylaxis::  Not indicated  Assessed for rehabilitation services:  Patient was assess for and/or received rehabilitation services during this hospitalization